# Patient Record
Sex: MALE | Race: WHITE | NOT HISPANIC OR LATINO | Employment: FULL TIME | URBAN - METROPOLITAN AREA
[De-identification: names, ages, dates, MRNs, and addresses within clinical notes are randomized per-mention and may not be internally consistent; named-entity substitution may affect disease eponyms.]

---

## 2023-11-27 ENCOUNTER — OFFICE VISIT (OUTPATIENT)
Dept: PODIATRY | Facility: CLINIC | Age: 58
End: 2023-11-27
Payer: COMMERCIAL

## 2023-11-27 ENCOUNTER — ANCILLARY PROCEDURE (OUTPATIENT)
Dept: RADIOLOGY | Facility: CLINIC | Age: 58
End: 2023-11-27
Payer: COMMERCIAL

## 2023-11-27 DIAGNOSIS — M76.822 POSTERIOR TIBIAL TENDINITIS OF LEFT LOWER EXTREMITY: ICD-10-CM

## 2023-11-27 DIAGNOSIS — M76.822 POSTERIOR TIBIAL TENDINITIS OF LEFT LOWER EXTREMITY: Primary | ICD-10-CM

## 2023-11-27 PROCEDURE — 73610 X-RAY EXAM OF ANKLE: CPT | Mod: LT,FY

## 2023-11-27 PROCEDURE — 99213 OFFICE O/P EST LOW 20 MIN: CPT | Performed by: PODIATRIST

## 2023-11-27 PROCEDURE — 73610 X-RAY EXAM OF ANKLE: CPT | Mod: LEFT SIDE | Performed by: RADIOLOGY

## 2023-11-27 RX ORDER — DIPHENHYDRAMINE HCL 25 MG
25 CAPSULE ORAL EVERY 6 HOURS PRN
COMMUNITY

## 2023-11-27 RX ORDER — TAMSULOSIN HYDROCHLORIDE 0.4 MG/1
CAPSULE ORAL
COMMUNITY
Start: 2014-02-17

## 2023-11-27 RX ORDER — ESOMEPRAZOLE MAGNESIUM 40 MG/1
CAPSULE, DELAYED RELEASE ORAL
COMMUNITY
Start: 2014-03-13

## 2023-11-27 NOTE — PROGRESS NOTES
History Of Present Illness  Jose Luis Price is a 58 y.o. male presenting with chief complaint of:   Transition of care  Patient complains of eft foot pain with radiation into ankle.  Patient states he gets a cramp in his foot and if he aggressively massages this area he tends to feel better.  He now has a stabbing pain in his ankle.  He does not have full function of the left foot.  He has discomfort when he runs.  He has modified his activities greatly.  He has been doing home therapy from previous ankle injuries to rehab this.  Unfortunately his pain is only worsening.  Past Medical History  He has no past medical history on file.    Surgical History  He has no past surgical history on file.     Social History  He has no history on file for tobacco use, alcohol use, and drug use.    Family History  No family history on file.     Allergies  Erythromycin and Penicillins    Medications  Current Outpatient Medications   Medication Sig Dispense Refill    diphenhydrAMINE (BENADryl) 25 mg capsule Take 1 capsule (25 mg) by mouth every 6 hours if needed.      NexIUM 40 mg DR capsule Take by mouth.      tamsulosin (Flomax) 0.4 mg 24 hr capsule        No current facility-administered medications for this visit.       Review of Systems    REVIEW OF SYSTEMS  GENERAL:  Negative for malaise, significant weight loss, fever  CARDIOVASCULAR: leg swelling   MUSCULOSKELETAL:  Negative for joint pain or swelling, back pain, and muscle pain.  SKIN:  Negative for lesions, rash, and itching  PSYCH:  Negative for sleep disturbance, mood disorder and recent psychosocial stressors  NEURO: Negative, denies any burning, tingling or numbness     Objective:   Vasc: DP and PT pulses are palpable bilateral.  CFT is less than 3 seconds bilateral.  Skin temperature is warm to cool proximal to distal bilateral.      Neuro:  Light touch is intact to the foot bilateral.  Protective sensation is intact to the foot when tested with the 5.07 SWM bilateral.   There is no clonus noted.  The hallux is downgoing bilateral.      Derm: Nails are normal. Skin is supple with normal texture and turgor noted.  Webspaces are clean, dry and intact bilateral.  There are no hyperkeratoses, ulcerations, verruca or other lesions noted.      Ortho: Muscle strength is 4/5 for inversion on the left foot.  He has pain at the insertion site of the posterior tibial tendon.  He has pain traveling up the posterior tibial tendon on the left.  Assessment/Plan     Diagnoses and all orders for this visit:  Posterior tibial tendinitis of left lower extremity  -     XR ankle left 3+ views; Future      Patient may ultimately need an MRI to ascertain if he has a fracture.  We will review plain films first.         Liv Mueller CMA

## 2023-12-08 ENCOUNTER — TELEPHONE (OUTPATIENT)
Dept: PODIATRY | Facility: CLINIC | Age: 58
End: 2023-12-08
Payer: COMMERCIAL

## 2023-12-08 NOTE — TELEPHONE ENCOUNTER
Alley called from  to inform us that the MRI of the ankle you ordered for Jose Luis had been denied. The phone number for a [id number] is [phone] and the reference number is [id number]. She said if you have any questions, you can call her back at [phone]. She said when we get a decision after doing the [id number], she would like for us to let her know.

## 2023-12-11 NOTE — TELEPHONE ENCOUNTER
I couldn't do 8:50 because they do it in quarters, so it's scheduled for tomorrow at 8:45am. I did give them your email address so you get a confirmation email.

## 2023-12-12 DIAGNOSIS — M76.822 POSTERIOR TIBIAL TENDINITIS OF LEFT LOWER EXTREMITY: Primary | ICD-10-CM

## 2023-12-19 ENCOUNTER — APPOINTMENT (OUTPATIENT)
Dept: RADIOLOGY | Facility: CLINIC | Age: 58
End: 2023-12-19
Payer: COMMERCIAL

## 2023-12-26 ENCOUNTER — HOSPITAL ENCOUNTER (OUTPATIENT)
Dept: RADIOLOGY | Facility: HOSPITAL | Age: 58
Discharge: HOME | End: 2023-12-26
Payer: COMMERCIAL

## 2023-12-26 DIAGNOSIS — M76.822 POSTERIOR TIBIAL TENDINITIS OF LEFT LOWER EXTREMITY: ICD-10-CM

## 2023-12-26 PROCEDURE — 73721 MRI JNT OF LWR EXTRE W/O DYE: CPT | Mod: LEFT SIDE | Performed by: RADIOLOGY

## 2023-12-26 PROCEDURE — 73721 MRI JNT OF LWR EXTRE W/O DYE: CPT | Mod: LT

## 2023-12-27 ENCOUNTER — APPOINTMENT (OUTPATIENT)
Dept: RADIOLOGY | Facility: CLINIC | Age: 58
End: 2023-12-27
Payer: COMMERCIAL

## 2024-01-04 ENCOUNTER — APPOINTMENT (OUTPATIENT)
Dept: PODIATRY | Facility: CLINIC | Age: 59
End: 2024-01-04
Payer: COMMERCIAL

## 2024-01-05 ENCOUNTER — APPOINTMENT (OUTPATIENT)
Dept: PODIATRY | Facility: CLINIC | Age: 59
End: 2024-01-05
Payer: COMMERCIAL

## 2024-01-17 ENCOUNTER — OFFICE VISIT (OUTPATIENT)
Dept: PODIATRY | Facility: CLINIC | Age: 59
End: 2024-01-17
Payer: COMMERCIAL

## 2024-01-17 DIAGNOSIS — M76.822 POSTERIOR TIBIAL TENDINITIS OF LEFT LOWER EXTREMITY: Primary | ICD-10-CM

## 2024-01-17 PROCEDURE — 99213 OFFICE O/P EST LOW 20 MIN: CPT | Performed by: PODIATRIST

## 2024-01-17 NOTE — PROGRESS NOTES
History Of Present Illness  Jose Luis Price is a 58 y.o. male presenting with chief complaint of:  In office for walking boot post MRI   Pt is miserable, he states his right great toe is very painful. He did have xrays a year ago.    Past Medical History  He has no past medical history on file.    Surgical History  He has no past surgical history on file.     Social History  He has no history on file for tobacco use, alcohol use, and drug use.    Family History  No family history on file.     Allergies  Erythromycin and Penicillins    Medications  Current Outpatient Medications   Medication Sig Dispense Refill    diphenhydrAMINE (BENADryl) 25 mg capsule Take 1 capsule (25 mg) by mouth every 6 hours if needed.      NexIUM 40 mg DR capsule Take by mouth.      tamsulosin (Flomax) 0.4 mg 24 hr capsule        No current facility-administered medications for this visit.       Review of Systems    REVIEW OF SYSTEMS  GENERAL:  Negative for malaise, significant weight loss, fever  CARDIOVASCULAR: leg swelling   MUSCULOSKELETAL:  Negative for joint pain or swelling, back pain, and muscle pain.  SKIN:  Negative for lesions, rash, and itching  PSYCH:  Negative for sleep disturbance, mood disorder and recent psychosocial stressors  NEURO: Negative, denies any burning, tingling or numbness     Objective:   Vasc: DP and PT pulses are palpable bilateral.  CFT is less than 3 seconds bilateral.  Skin temperature is warm to cool proximal to distal bilateral.      Neuro:  Light touch is intact to the foot bilateral.      Derm: Nails are normal. Skin is supple with normal texture and turgor noted.  Webspaces are clean, dry and intact bilateral.  There are no hyperkeratoses, ulcerations, verruca or other lesions noted.      Ortho: Muscle strength is 5/5 for all pedal groups tested.  good ROM of first MPJ right first with no edema  Left foot, pain along course of PT tendon around TN joint  MRI suggests PT tendonitis and and possible C-N  coalition   Assessment/Plan     Diagnoses and all orders for this visit:  Posterior tibial tendinitis of left lower extremity  -     Walking Boot Short      Will review old films form first toe  Try boot LLE for one month            Liv Mueller CMA

## 2024-01-18 PROBLEM — M76.822 POSTERIOR TIBIAL TENDINITIS OF LEFT LOWER EXTREMITY: Status: ACTIVE | Noted: 2024-01-18

## 2024-02-08 ENCOUNTER — APPOINTMENT (OUTPATIENT)
Dept: PODIATRY | Facility: CLINIC | Age: 59
End: 2024-02-08
Payer: COMMERCIAL

## 2024-06-25 ENCOUNTER — APPOINTMENT (OUTPATIENT)
Dept: PODIATRY | Facility: CLINIC | Age: 59
End: 2024-06-25
Payer: COMMERCIAL

## 2024-06-25 DIAGNOSIS — M25.871 SESAMOIDITIS OF RIGHT FOOT: Primary | ICD-10-CM

## 2024-06-25 PROCEDURE — 99213 OFFICE O/P EST LOW 20 MIN: CPT | Performed by: PODIATRIST

## 2024-06-25 PROCEDURE — 20600 DRAIN/INJ JOINT/BURSA W/O US: CPT | Performed by: PODIATRIST

## 2024-06-25 RX ORDER — LIDOCAINE HYDROCHLORIDE 10 MG/ML
2 INJECTION INFILTRATION; PERINEURAL
Status: COMPLETED | OUTPATIENT
Start: 2024-06-25 | End: 2024-06-25

## 2024-06-25 RX ORDER — TRIAMCINOLONE ACETONIDE 40 MG/ML
10 INJECTION, SUSPENSION INTRA-ARTICULAR; INTRAMUSCULAR
Status: COMPLETED | OUTPATIENT
Start: 2024-06-25 | End: 2024-06-25

## 2024-06-25 NOTE — PROGRESS NOTES
History Of Present Illness  Jose Luis Price is a 58 y.o. male presenting with chief complaint of: right foot & great toe pain.  Patient is miserable today.  He is having intense pain on the right foot.  He said states the left foot aches occasionally.  He is never without pain.  He did not wear cam walker as prescribed.  He did not want to follow-up with orthopedics.  PCP Dr ben Huber  Last visit      Past Medical History  He has no past medical history on file.    Surgical History  He has no past surgical history on file.     Social History  He has no history on file for tobacco use, alcohol use, and drug use.    Family History  No family history on file.     Allergies  Erythromycin and Penicillins    Medications  Current Outpatient Medications   Medication Sig Dispense Refill    diphenhydrAMINE (BENADryl) 25 mg capsule Take 1 capsule (25 mg) by mouth every 6 hours if needed.      NexIUM 40 mg DR capsule Take by mouth.      tamsulosin (Flomax) 0.4 mg 24 hr capsule        No current facility-administered medications for this visit.       Review of Systems    REVIEW OF SYSTEMS  GENERAL:  Negative for malaise, significant weight loss, fever  CARDIOVASCULAR: leg swelling   MUSCULOSKELETAL:  Negative for joint pain or swelling, back pain, and muscle pain.  SKIN:  Negative for lesions, rash, and itching  PSYCH:  Negative for sleep disturbance, mood disorder and recent psychosocial stressors  NEURO: Negative, denies any burning, tingling or numbness     Objective:   Vasc: DP and PT pulses are palpable bilateral.  CFT is less than 3 seconds bilateral.  Skin temperature is warm to cool proximal to distal bilateral.      Neuro:  Light touch is intact to the foot bilateral.   There is no clonus noted.  The hallux is downgoing bilateral.      Derm: Nails are normal. Skin is supple with normal texture and turgor noted.  Webspaces are clean, dry and intact bilateral.  There are no hyperkeratoses, ulcerations, verruca or other  lesions noted.      Ortho: Muscle strength is 5/5 for all pedal groups tested.  Mild edema without calor is appreciated about the first metatarsophalangeal joint.  Patient has exquisite pain to palpation of the tibial sesamoid.  Assessment/Plan     Diagnoses and all orders for this visit:  Sesamoiditis of right foot      Feel patient is experiencing sesamoiditis of the tibial sesamoid right foot.  Today we will try cortisone injection.  Hopefully this will alleviate his symptoms.  Patient is to give me a call in 2 weeks and let me know how he is doing.  May need an x-ray.         Patient ID: Jose Luis Price is a 58 y.o. male.    S Inj/Asp: R great MTP on 6/25/2024 4:49 PM  Indications: pain and joint swelling  Details: 25 G needle, plantar approach  Medications: 10 mg triamcinolone acetonide 40 mg/mL; 2 mL lidocaine 10 mg/mL (1 %)    Injected the small space between tibial sesamoid and first metatarsal head.

## 2024-12-21 ENCOUNTER — APPOINTMENT (OUTPATIENT)
Dept: URGENT CARE | Age: 59
End: 2024-12-21
Payer: COMMERCIAL

## 2024-12-21 ASSESSMENT — ENCOUNTER SYMPTOMS: SORE THROAT: 1
